# Patient Record
(demographics unavailable — no encounter records)

---

## 2024-10-26 NOTE — REVIEW OF SYSTEMS
[FreeTextEntry2] : fatigue [FreeTextEntry4] : breast pain improving [FreeTextEntry9] : back and RLE pain improving. LLE calf hurting  [de-identified] : breast swelling improving

## 2024-10-26 NOTE — HISTORY OF PRESENT ILLNESS
[FreeTextEntry1] : Ms. Aida Dick is a 53-year-old female with history of right breast DCIS and 2.3 mm area of invasive breast cancer, noted at time of lumpectomy to have positive superior margin for DCIS on 11/15/23; underwent re-excision and SLN (0/1) both of which were negative and oncoplastic reconstruction right breast on 2/7/24. Completed radiation therapy to a total dose of 2600 cGy in 5 fractions to the right breast on 4/9/24. Started on Lupron on 4/17/24. Plans to eventually Anastrozole.   She was referred to our clinic by Dr. West for worsening pain and reduced right shoulder range of motion.   She has decreased right shoulder range of motion for about a month, but it resolved about a week ago. She does shoulder stretches at home. She had a lot of right breast pain and then had a seroma drainage which gave her significant relief. She has another scheduled in 2 weeks. She uses a heat pad/ice packs which help.   She has stabbing pain in her right breast. She takes Tylenol and ibuprofen which help. She takes medical marijuana HS which she finds helpful.    ----------------------------------------------------------------- Interval : breast pain controlled by Duloxetine hot flashed controlled by Gabapentin  working on radha more active here for LD surveillance

## 2024-10-26 NOTE — ASSESSMENT
[FreeTextEntry1] : 53-year-old female here for evaluation.  #At risk for lymphedema RUE: -No clinical signs of lymphedema on exam with measurements at wrist, elbow, mid arm -Lymphedema education provided to patient -Next surveillance:  Jan 2025   #Right Breast pain -Continue Duloxetine 60 mg daily-refill sent -Continue Gabapentin 300 mg HS-refill sent  #Vaginal dryness   -hyaluronic acid suppositories   -Follow-up in 3 months or earlier

## 2024-10-26 NOTE — PHYSICAL EXAM
[FreeTextEntry1] : Gen: Patient is A&O x 3, NAD HEENT: EOMI, hearing grossly normal Resp: regular, non - labored CV: pulses regular Skin: no rashes, erythema Lymph: no clubbing, cyanosis, right breast swelling improving. Mild edema in LLE ROM: full throughout. She is able to fully DF and PF her left ankle Palpation: TTP of the left calf Reflexes: 1+ and symmetric throughout Strength: 5/5 for bilateral upper and lower extremities Special tests: Increased pain to squeezing the left calf.  Gait: Antalgic and favoring the LLE. Able to perform a sit to stand with mild difficulty.  -Bioimpedance spectroscopy performed today with L-dex RUE -0.3 (-2.8 post-operative baseline).

## 2024-11-26 NOTE — HISTORY OF PRESENT ILLNESS
[FreeTextEntry8] : 53 year old female presents with complaints of cough and congestion since last week.  She reports she attended a party where a person later was found to have covid.  She has taken 2 covid tests at home and were negative.  Currently she denies any chest pain, shortness of breath or palpitations.

## 2024-11-26 NOTE — REVIEW OF SYSTEMS
[Cough] : cough [Fever] : no fever [Night Sweats] : no night sweats [Discharge] : no discharge [Vision Problems] : no vision problems [Earache] : no earache [Nasal Discharge] : no nasal discharge [Chest Pain] : no chest pain [Orthopnea] : no orthopnea [Shortness Of Breath] : no shortness of breath [Abdominal Pain] : no abdominal pain [Vomiting] : no vomiting [Dysuria] : no dysuria [Hematuria] : no hematuria [Joint Pain] : no joint pain [Muscle Pain] : no muscle pain [Itching] : no itching [Skin Rash] : no skin rash [Headache] : no headache [Memory Loss] : no memory loss [Suicidal] : not suicidal [Easy Bleeding] : no easy bleeding

## 2024-11-26 NOTE — PLAN
[FreeTextEntry1] : Cough - will check viral panel, start azithromycin if viral panel negative.  Will check screening labs.  Obesity- on wegovy, f/u with endocrinology Thyroid nodule/MNG - following w/ endocrinology, has appt pending later this week

## 2024-11-29 NOTE — HISTORY OF PRESENT ILLNESS
[FreeTextEntry1] :  f/up of obesity and MNG  Obesity:  Prior weight loss surgery: Yes , in 2002  Most recent dose: wegovy 1mg Q weekly (no n/v/d)  Concern with oral agents given underlying mood condition on medication   History of the following:   Thyroid disease: MNG Heart disease: No Mood disorder: No Hypertension: No Seizures: No    Thyroid nodule:   s/p FNA , bethesda IV  Thyroid molecular panel negative for mutation. Advised patient on reduced risk of malignancy of thyroid given relatively high NPV, however still small percentage that can be malignant. Reviewed option of lobectomy vs continued monitoring. Patient reported currently dealing with DCIS of breast and would prefer to manage that first, and follow with sonogram of thyroid in 5-6 month   No compressive symptoms

## 2024-11-29 NOTE — PHYSICAL EXAM
[Alert] : alert [Well Nourished] : well nourished [No Acute Distress] : no acute distress [Well Developed] : well developed [Normal Sclera/Conjunctiva] : normal sclera/conjunctiva [EOMI] : extra ocular movement intact [No Proptosis] : no proptosis [Normal Oropharynx] : the oropharynx was normal [No LAD] : no lymphadenopathy [Thyroid Not Enlarged] : the thyroid was not enlarged [No Respiratory Distress] : no respiratory distress [No Accessory Muscle Use] : no accessory muscle use [Clear to Auscultation] : lungs were clear to auscultation bilaterally [Normal S1, S2] : normal S1 and S2 [Normal Rate] : heart rate was normal [Regular Rhythm] : with a regular rhythm [No Edema] : no peripheral edema [Pedal Pulses Normal] : the pedal pulses are present [Normal Bowel Sounds] : normal bowel sounds [Not Tender] : non-tender [Not Distended] : not distended [Soft] : abdomen soft [Normal Anterior Cervical Nodes] : no anterior cervical lymphadenopathy [No Spinal Tenderness] : no spinal tenderness [Spine Straight] : spine straight [No Stigmata of Cushings Syndrome] : no stigmata of Cushings Syndrome [Normal Gait] : normal gait [Normal Strength/Tone] : muscle strength and tone were normal [No Rash] : no rash [Acanthosis Nigricans] : no acanthosis nigricans [Normal Reflexes] : deep tendon reflexes were 2+ and symmetric [No Tremors] : no tremors [Oriented x3] : oriented to person, place, and time

## 2025-01-27 NOTE — ASSESSMENT
[FreeTextEntry1] : 52 yo female, reported hx of Crohn's disease, with sister hx of colon cancer in 40s, pt with stage 1 DCIS on right breast, pending lumpectomy. Has regular bms. Used to be on asacol, no manages with marijuana.Does not describe blood or mucus in stool. Hx of bariatric surgery with Bryan Myers MD in past, inability to lose weight. SHe has occ bloating and epigastric pain.  RTO- s/p right lumpectomy stage 1 breast cancer, pending another breast sgy to " clean margins."Pt to revisit Dr. Peace. EGD with 1 cm hiatal hernia, colonoscopy was neg. Previously on asacol, discontinued several years ago.Has gained weight 7 lbs since 9/23. Used Wegovy with some help, but in short supply. Reviewed colonoscopy with normal mucosa and TI.  No evidence of INflammatory bowel disease on colonoscopy Pt states BMs have normalized.  RTO 9/25/24- complains "hernia pain" right side. Remains on wegovy, now on 1 mg weekly. Lost 18 lbs since May 2024. using foot stool to obtain BM. Completed RT for breast ca. Has regular bms with stool. States pressing on upper abdomen causes severe pain and nausea.  RTO 1/27/25- returned from [facility] - dr. Payne today-- will request records. ON duloxetine 60 mg, gabapentin 300 mg and anastrazole 1 mg. Started Wegovy with Dr. Jaquez for weight loss. Lost more than 40 lbs sin ce last year. Has occ .loose bms with Wegovy.-- states similar to her Crohn's. Reviwed a1c 11/25/24 which was normal. CMP/hepatic profile in november was normal.No   more right sided abdominal pain. IMP: 1. ruq pain, epigastric pain, hx of bariatric sgy- resolved 2. Obesity, on wegovy 3. Hx of DCIS s/p lumpectomy and RT 4. altered bms, "oily" PLAN: 1. if sxs worsen, will obtain fecal calprotectin and elastase 2. Follow up 4 months or as needed

## 2025-01-27 NOTE — REVIEW OF SYSTEMS
[Negative] : Heme/Lymph [Recent Weight Gain (___ Lbs)] : no recent weight gain [Recent Weight Loss (___ Lbs)] : recent [unfilled] ~Ulb weight loss [Abdominal Pain] : no abdominal pain [Diarrhea] : diarrhea [FreeTextEntry7] : nausea

## 2025-02-27 NOTE — HISTORY OF PRESENT ILLNESS
[FreeTextEntry1] :  f/up of obesity and MNG  Obesity:  Prior weight loss surgery: Yes , in 2002  Most recent dose: wegovy 1.7mg Q weekly (no n/v/d)  Concern with oral agents given underlying mood condition on medication   History of the following:   Thyroid disease: MNG Heart disease: No Mood disorder: No Hypertension: No Seizures: No    Thyroid nodule:   s/p FNA , bethesda IV  Thyroid molecular panel negative for mutation. Advised patient on reduced risk of malignancy of thyroid given relatively high NPV, however still small percentage that can be malignant. Reviewed option of lobectomy vs continued monitoring. Patient reported currently dealing with DCIS of breast and would prefer to manage that first, and follow with sonogram of thyroid in 5-6 month. Repat thyroid sonogram reported stability    No compressive symptoms

## 2025-02-27 NOTE — PHYSICAL EXAM
[Alert] : alert [Well Nourished] : well nourished [No Acute Distress] : no acute distress [Well Developed] : well developed [Normal Sclera/Conjunctiva] : normal sclera/conjunctiva [EOMI] : extra ocular movement intact [No Proptosis] : no proptosis [Normal Oropharynx] : the oropharynx was normal [No LAD] : no lymphadenopathy [Thyroid Not Enlarged] : the thyroid was not enlarged [No Respiratory Distress] : no respiratory distress [No Accessory Muscle Use] : no accessory muscle use [Clear to Auscultation] : lungs were clear to auscultation bilaterally [Normal S1, S2] : normal S1 and S2 [Normal Rate] : heart rate was normal [Regular Rhythm] : with a regular rhythm [No Edema] : no peripheral edema [Pedal Pulses Normal] : the pedal pulses are present [Normal Bowel Sounds] : normal bowel sounds [Not Tender] : non-tender [Not Distended] : not distended [Soft] : abdomen soft [Normal Anterior Cervical Nodes] : no anterior cervical lymphadenopathy [No Spinal Tenderness] : no spinal tenderness [Spine Straight] : spine straight [No Stigmata of Cushings Syndrome] : no stigmata of Cushings Syndrome [Normal Gait] : normal gait [Normal Strength/Tone] : muscle strength and tone were normal [No Rash] : no rash [Normal Reflexes] : deep tendon reflexes were 2+ and symmetric [No Tremors] : no tremors [Oriented x3] : oriented to person, place, and time [Obese] : obese [Acanthosis Nigricans] : no acanthosis nigricans Aortic valve prosthesis present    S/P CABG x 3    S/P cholecystectomy  last year  S/P lumbar spine operation

## 2025-04-03 NOTE — REVIEW OF SYSTEMS
[FreeTextEntry2] : fatigue [FreeTextEntry4] : breast pain improving [FreeTextEntry9] : back and RLE pain improving. LLE calf hurting  [de-identified] : breast swelling improving

## 2025-04-03 NOTE — PHYSICAL EXAM
[FreeTextEntry1] : Gen: Patient is A&O x 3, NAD HEENT: EOMI, hearing grossly normal Resp: regular, non - labored CV: pulses regular Skin: no rashes, erythema Lymph: no clubbing, cyanosis, right breast swelling improving. Mild edema in LLE ROM: full throughout. She is able to fully DF and PF her left ankle Palpation: TTP of the left calf Reflexes: 1+ and symmetric throughout Strength: 5/5 for bilateral upper and lower extremities Special tests: Increased pain to squeezing the left calf.  Gait: Antalgic and favoring the LLE. Able to perform a sit to stand with mild difficulty.  -Bioimpedance spectroscopy performed today with L-dex RUE - green

## 2025-04-03 NOTE — ASSESSMENT
[FreeTextEntry1] : 53-year-old female here for evaluation.  #At risk for lymphedema RUE: -No clinical signs of lymphedema on exam -Lymphedema education provided to patient -Bioimpedance spectroscopy performed today with L-dex  green  -Next surveillance June 2025    #Right Breast pain -Continue Duloxetine 60 mg daily-refill sent -Continue Gabapentin 300 mg HS-refill sent  #Vaginal dryness  - uberlube, -hyaluronic acid suppositories   -Follow-up in 3 months or earlier   The patient had a baseline SOZO measurement, which I reviewed today. Bioimpedance spectroscopy helps identify the onset of lymphedema in an arm or leg before patients experience noticeable swelling. Research has shown that the early detection of lymphedema using L-Dex combined with treatment can reduce progression to chronic lymphedema by 95% in breast cancer patients. Whenever possible, patients are tested for baseline L-Dex score before cancer treatment begins and then are reassessed during regular follow-up visits using the SOZO device. Otherwise, this can be started postoperatively and continued during regular follow-up visits. If the patient's L-Dex score increases above normal levels, that is a sign that lymphedema is developing, and a referral is made to physical therapy for further evaluation and/or early compression treatment. Lymphedema assessment with the SOZO L-Dex score is recommended to be done every 3 months for the first 3 years and then every 6 months for years 4 and 5, followed by annually afterwards.   I spent a total of 30 minutes on this encounter including documentation, face to face time, care coordination and reviewing prior records from surgical, radiation and medical oncology.

## 2025-04-03 NOTE — HISTORY OF PRESENT ILLNESS
[FreeTextEntry1] : Ms. Aida Dick is a 53-year-old female with history of right breast DCIS and 2.3 mm area of invasive breast cancer, noted at time of lumpectomy to have positive superior margin for DCIS on 11/15/23; underwent re-excision and SLN (0/1) both of which were negative and oncoplastic reconstruction right breast on 2/7/24. Completed radiation therapy to a total dose of 2600 cGy in 5 fractions to the right breast on 4/9/24. Started on Lupron on 4/17/24. Plans to eventually Anastrozole.      ----------------------------------------------------------------- Interval : + vaginal dryness breast pain controlled by Duloxetine 30 MG  hot flashed controlled by Gabapentin  no interval swelling, continues weight loss

## 2025-06-18 NOTE — PHYSICAL EXAM
[Alert] : alert [Well Nourished] : well nourished [Obese] : obese [No Acute Distress] : no acute distress [Well Developed] : well developed [Normal Sclera/Conjunctiva] : normal sclera/conjunctiva [EOMI] : extra ocular movement intact [No Proptosis] : no proptosis [Normal Oropharynx] : the oropharynx was normal [No LAD] : no lymphadenopathy [Thyroid Not Enlarged] : the thyroid was not enlarged [No Respiratory Distress] : no respiratory distress [No Accessory Muscle Use] : no accessory muscle use [Clear to Auscultation] : lungs were clear to auscultation bilaterally [Normal S1, S2] : normal S1 and S2 [Normal Rate] : heart rate was normal [Regular Rhythm] : with a regular rhythm [No Edema] : no peripheral edema [Pedal Pulses Normal] : the pedal pulses are present [Normal Bowel Sounds] : normal bowel sounds [Not Tender] : non-tender [Not Distended] : not distended [Soft] : abdomen soft [Normal Anterior Cervical Nodes] : no anterior cervical lymphadenopathy [No Spinal Tenderness] : no spinal tenderness [Spine Straight] : spine straight [No Stigmata of Cushings Syndrome] : no stigmata of Cushings Syndrome [Normal Gait] : normal gait [Normal Strength/Tone] : muscle strength and tone were normal [No Rash] : no rash [Normal Reflexes] : deep tendon reflexes were 2+ and symmetric [No Tremors] : no tremors [Oriented x3] : oriented to person, place, and time [Acanthosis Nigricans] : no acanthosis nigricans

## 2025-06-18 NOTE — HISTORY OF PRESENT ILLNESS
[FreeTextEntry1] :  f/up of obesity and MNG  Obesity:  Prior weight loss surgery: Yes , in 2002  Most recent dose: wegovy 2.4mg Q weekly (no n/v/d)  Concern with oral agents given underlying mood condition on medication   History of the following:   Thyroid disease: MNG Heart disease: No Mood disorder: No Hypertension: No Seizures: No    Thyroid nodule:   s/p FNA , bethesda IV  Thyroid molecular panel negative for mutation. Advised patient on reduced risk of malignancy of thyroid given relatively high NPV, however still small percentage that can be malignant. Reviewed option of lobectomy vs continued monitoring. Patient reported currently dealing with DCIS of breast and would prefer to manage that first, and follow with sonogram of thyroid in 5-6 month.  Repeat thyroid sonogram reported stability    No compressive symptoms

## 2025-07-17 NOTE — ASSESSMENT
[FreeTextEntry1] : 53-year-old female here for evaluation.  #At risk for lymphedema RUE: -No clinical signs of lymphedema on exam -Lymphedema education provided to patient -Bioimpedance spectroscopy performed today with L-dex  yellow 4.2 -compression screen given  -Next surveillance Oct 2025   #Right Breast pain -Continue Duloxetine 60 mg daily-refill sent -Continue Gabapentin 300 mg HS-refill sent  #Vaginal dryness  - uberlube, -hyaluronic acid suppositories   -Follow-up in 3 months or earlier   The patient had a baseline SOZO measurement, which I reviewed today. Bioimpedance spectroscopy helps identify the onset of lymphedema in an arm or leg before patients experience noticeable swelling. Research has shown that the early detection of lymphedema using L-Dex combined with treatment can reduce progression to chronic lymphedema by 95% in breast cancer patients. Whenever possible, patients are tested for baseline L-Dex score before cancer treatment begins and then are reassessed during regular follow-up visits using the SOZO device. Otherwise, this can be started postoperatively and continued during regular follow-up visits. If the patient's L-Dex score increases above normal levels, that is a sign that lymphedema is developing, and a referral is made to physical therapy for further evaluation and/or early compression treatment. Lymphedema assessment with the SOZO L-Dex score is recommended to be done every 3 months for the first 3 years and then every 6 months for years 4 and 5, followed by annually afterwards.   I spent a total of 30 minutes on this encounter including documentation, face to face time, care coordination and reviewing prior records from surgical, radiation and medical oncology.

## 2025-07-17 NOTE — REVIEW OF SYSTEMS
[FreeTextEntry2] : fatigue [FreeTextEntry4] : breast pain improving [FreeTextEntry9] : back and RLE pain improving. LLE calf hurting  [de-identified] : breast swelling improving

## 2025-07-17 NOTE — PHYSICAL EXAM
[FreeTextEntry1] : Gen: Patient is A&O x 3, NAD HEENT: EOMI, hearing grossly normal Resp: regular, non - labored CV: pulses regular Skin: no rashes, erythema Lymph: no clubbing, cyanosis, right breast swelling improving. Mild edema in LLE ROM: full throughout. She is able to fully DF and PF her left ankle Palpation: TTP of the left calf Reflexes: 1+ and symmetric throughout Strength: 5/5 for bilateral upper and lower extremities Special tests: Increased pain to squeezing the left calf.  Gait: Antalgic and favoring the LLE. Able to perform a sit to stand with mild difficulty.  -Bioimpedance spectroscopy performed today with L-dex CAS -yellow 7/17

## 2025-07-17 NOTE — HISTORY OF PRESENT ILLNESS
[FreeTextEntry1] : Ms. Aida Dick is a 54-year-old female with history of right breast DCIS and 2.3 mm area of invasive breast cancer, noted at time of lumpectomy to have positive superior margin for DCIS on 11/15/23; underwent re-excision and SLN (0/1) both of which were negative and oncoplastic reconstruction right breast on 2/7/24. Completed radiation therapy to a total dose of 2600 cGy in 5 fractions to the right breast on 4/9/24. Started on Lupron on 4/17/24. Plans to eventually Anastrozole.   breast pain controlled by Duloxetine 30 MG  hot flashed controlled by Gabapentin  no interval swelling, continues weight loss and exercise./